# Patient Record
Sex: FEMALE | Race: OTHER | HISPANIC OR LATINO | URBAN - METROPOLITAN AREA
[De-identification: names, ages, dates, MRNs, and addresses within clinical notes are randomized per-mention and may not be internally consistent; named-entity substitution may affect disease eponyms.]

---

## 2017-02-13 ENCOUNTER — OUTPATIENT (OUTPATIENT)
Dept: OUTPATIENT SERVICES | Facility: HOSPITAL | Age: 22
LOS: 1 days | Discharge: HOME | End: 2017-02-13

## 2017-02-27 DIAGNOSIS — F10.21 ALCOHOL DEPENDENCE, IN REMISSION: ICD-10-CM

## 2017-08-17 ENCOUNTER — EMERGENCY (EMERGENCY)
Facility: HOSPITAL | Age: 22
LOS: 1 days | Discharge: HOME | End: 2017-08-17
Attending: EMERGENCY MEDICINE | Admitting: EMERGENCY MEDICINE
Payer: COMMERCIAL

## 2017-08-17 VITALS
TEMPERATURE: 98 F | DIASTOLIC BLOOD PRESSURE: 85 MMHG | RESPIRATION RATE: 18 BRPM | SYSTOLIC BLOOD PRESSURE: 121 MMHG | OXYGEN SATURATION: 98 % | HEART RATE: 82 BPM

## 2017-08-17 DIAGNOSIS — F60.3 BORDERLINE PERSONALITY DISORDER: ICD-10-CM

## 2017-08-17 DIAGNOSIS — F33.9 MAJOR DEPRESSIVE DISORDER, RECURRENT, UNSPECIFIED: ICD-10-CM

## 2017-08-17 DIAGNOSIS — F10.20 ALCOHOL DEPENDENCE, UNCOMPLICATED: ICD-10-CM

## 2017-08-17 PROCEDURE — 99284 EMERGENCY DEPT VISIT MOD MDM: CPT

## 2017-08-17 PROCEDURE — 90792 PSYCH DIAG EVAL W/MED SRVCS: CPT

## 2017-08-17 RX ORDER — ALBUTEROL 90 UG/1
2 AEROSOL, METERED ORAL ONCE
Qty: 0 | Refills: 0 | Status: COMPLETED | OUTPATIENT
Start: 2017-08-17 | End: 2017-08-17

## 2017-08-17 RX ADMIN — ALBUTEROL 2 PUFF(S): 90 AEROSOL, METERED ORAL at 13:36

## 2017-08-17 NOTE — ED BEHAVIORAL HEALTH ASSESSMENT NOTE - NS ED BHA PLAN TR BH CONTACTED FT
call placed to [facility] and Dr. Gonzalez not available today, staff informed of ER visit, treat and release with follow up appointment made.

## 2017-08-17 NOTE — ED BEHAVIORAL HEALTH ASSESSMENT NOTE - FAMILY DETAILS
mother, aunt, grandma, uncle, sister (23) and brother (28) mother, aunt, grandma, uncle, sister (24) and brother (29) mother, aunt, grandma, uncle, twin sister (24) and brother (29)

## 2017-08-17 NOTE — ED BEHAVIORAL HEALTH ASSESSMENT NOTE - SUMMARY
Patient is a 21 year old female, domiciled in [facility] with family, employed as [employer], non-caregiver, with a previous diagnosis of Borderline Personality D/O and MDD, 5 prior hospitalizations, first at [place] in 2013 and most recent at [place] in summer 2015, current outpatient treatment at [facility] with therapist Georgiana Roman and psychiatrist Dr. Sarabia, + hx of self harm behavior cutting, denies prior suicide attempts but admits to one self-aborted gesture, denies manic or psychotic s/s, denies hx of violence or arrests, denies trauma, hx of alcohol abuse with 6 months sober, with no relevant past medical history, brought in by AA sponsor and boyfriend, from home, presenting with depression and self-harm cutting, in the context of guilt over relationship with boyfriend who is a  man.    Patient presents today after an episode of self harm in the context of increased guilt related to her relationship with a  man, two weeks of missed therapy appointments, and one week without her Abilify prescription. Patient denies any suicidal ideations/intent/plan or attempt. Denies any current urges to self harm. Denies any homicidal ideations or preoccupations with violence. Denies s/s of chaitanya or psychosis. Patient carries a diagnosis of Borderline PD and MDD. Her presentation today is most consistent with her axis 2 diagnosis rather than an acute exacerbation of an axis 1 diagnosis. Patient agrees that she is safe to return home at this time and has adequate outpatient resources in place. No acute need for inpatient hospitalization at this time. Friends and patient advised to return to ED or call 911 for any worsening symptoms or safety concerns. Patient is a 22 year old female, domiciled in [facility] with family, unemployed, non-caregiver, with a previous diagnosis of Borderline Personality D/O and MDD, 5 prior hospitalizations, first at [place] in 2013 and most recent at [place] in summer 2015, current outpatient treatment at [facility] with psychiatrist Dr. Gonzalez, + hx of self harm behavior cutting, denies prior suicide attempts but admits to one past overdose, denies manic or psychotic s/s, denies hx of violence or arrests, denies trauma, hx of alcohol dependence with 2 months sober with reported recent Xanax use of 6 pills from sister two days ago but denies daily use, with no relevant past medical history, brought in by staff from [place] internship program on patient request in the context of anxiety symptoms and concerns for risk of relapse on substances given history of Alcohol Dependence.  Patient reports an increase in anxiety symptoms for the "past couple of weeks" in the context of stopping her medications 4 months ago, which were just restarted 10 days ago at her last outpatient psychiatric appointment.  Patient reports she stopped taking her medications at the same time she discontinued treatment with her outpatient therapist but now expresses motivation to reengage in treatment and reports a positive therapeutic relationship with outpatient psychiatrist.  Additionally patient reports attending AA meetings daily and reports she has a strong support system through this, including a close relationship with sponsor who patient reports she feels she can confide in.  Patient with recent stressor of relapse with use of Xanax 2 days ago and on another occasion 1 week prior with patient coming for evaluation out of concern that this may lead to a relapse on alcohol, with no alcohol use reported x 2 months.  Additionally patient reports recent relationship break-up 6 weeks ago but expresses optimism of new relationship over past 2 weeks which patient is optimistic about and future oriented.  Patient endorses guilt feelings related to xanax use but denies acute depressed mood, chronic difficulty concentrating, fatigue, decreased appetite with no recent weight change, low motivation, difficulty falling asleep but no feelings of hopelessness or helplessness, no anhedonia.  Patient has a history of self harm cutting for many years when upset, last done two months ago with noted healed abrasions to upper extremities but denies any acute self injurious thoughts or impulses.  Patient denies any acute or passive suicidal ideation/intent/plan today and reports no suicidal ideation over the past 2 weeks.  No acute HI or violent thoughts, no acute signs or symptoms of dangerousness evident.  Denies any s/s of chaitanya or psychosis, denies auditory/visual hallucinations or delusions. Patient reports generalized anxiety for about 10 years and a history of panic attacks with an overwhelming fear/sense of danger, dizziness and increased sweating but no report of panic attacks within the past month.  Endorses chronic problems related to poor sleep and has taken OTC benadryl without benefit.        Patient presents today after an episode of self harm in the context of increased guilt related to her relationship with a  man, two weeks of missed therapy appointments, and one week without her Abilify prescription. Patient denies any suicidal ideations/intent/plan or attempt. Denies any current urges to self harm. Denies any homicidal ideations or preoccupations with violence. Denies s/s of chaitanya or psychosis. Patient carries a diagnosis of Borderline PD and MDD. Her presentation today is most consistent with her axis 2 diagnosis rather than an acute exacerbation of an axis 1 diagnosis. Patient agrees that she is safe to return home at this time and has adequate outpatient resources in place. No acute need for inpatient hospitalization at this time. Friends and patient advised to return to ED or call 911 for any worsening symptoms or safety concerns. Patient is a 22 year old female, domiciled in [facility] with family, unemployed, non-caregiver, with a previous diagnosis of Borderline Personality D/O and MDD, 5 prior hospitalizations, first at [place] in 2013 and most recent at [place] in summer 2015, current outpatient treatment at [facility] with psychiatrist Dr. Gonzalez, + hx of self harm behavior cutting, denies prior suicide attempts but admits to one past overdose, denies manic or psychotic s/s, denies hx of violence or arrests, denies trauma, hx of alcohol dependence with 2 months sober with reported recent Xanax use of 6 pills from sister two days ago but denies daily use brought in by staff from [place] internship program on patient request in the context of anxiety symptoms and concerns for risk of relapse on substances given history of Alcohol Dependence.  Patient reports an increase in anxiety symptoms in the context of stopping her medications 4 months ago, which were just restarted 10 days ago at her last outpatient psychiatric appointment.  Patient now expresses motivation to reengage in treatment and reports a positive therapeutic relationship with outpatient psychiatrist.  Additionally patient reports attending AA meetings daily and reports she has a strong support system through this.  Patient with recent stressor of relapse with use of Xanax 2 days ago and on another occasion 1 week prior with patient expressing concern that this may lead to a relapse on alcohol.  Patient expresses optimism of new relationship over past 2 weeks and patient is future oriented.  Patient endorses guilt feelings related to xanax use, chronic difficulty concentrating, fatigue, decreased appetite with no recent weight change, low motivation, difficulty falling asleep but denies acute depressed mood with no feelings of hopelessness or helplessness, no anhedonia.  Patient has a history of self harm cutting but denies any acute self injurious thoughts or impulses.  Patient denies any acute or passive suicidal ideation/intent/plan today and reports no suicidal ideation over the past 2 weeks.  No acute HI or violent thoughts, no acute signs or symptoms of dangerousness evident.  Denies any s/s of chaitanya or psychosis, denies auditory/visual hallucinations or delusions. Patient reports generalized anxiety but no evidence of any acute panic attacks.    Patient presents today after a recent episode of xanax use, not-prescribed, in the context of increased guilt related to such and concerns in the context of her alcohol dependence history.  Patient has recently reengaged in outpatient treatment with medications restarted 10 days ago and patient denies any suicidal ideations/intent/plan or attempt. Denies any current urges to self harm. Denies any homicidal ideations or preoccupations with violence. Denies s/s of chaitanya or psychosis. Patient carries a diagnosis of Borderline PD and MDD. Her presentation today is most consistent with concerns related to substance abuse and seeking of reassurance with no signs or symptoms of an acute exacerbation of an axis 1 diagnosis. Patient agrees that she is safe to return home at this time and has adequate outpatient resources in place. No acute need for inpatient hospitalization at this time. Patient is advised to return to ED or call 911 for any worsening symptoms or safety concerns and also provided the [facility] as an additional resource available..

## 2017-08-17 NOTE — ED ADULT NURSE NOTE - OBJECTIVE STATEMENT
pt received in  c/o "suicidal fantasy" with no plans, pt has a hx of SA via OD on pills, at this time pt denies active SI, HI&AH, denies ETOH and substance. calm and cooperative on arrival. awaiting psych eval.

## 2017-08-17 NOTE — ED BEHAVIORAL HEALTH ASSESSMENT NOTE - REFERRAL / APPOINTMENT DETAILS
follow up with outpatient providers - message left at  consultation center with request for expedited appointment follow up with outpatient providers - call placed to [facility] with appointment scheduled for patient with Dr. Gonzalez on Tuesday, August 22 at 7:20pm follow up with outpatient providers - call placed to [facility] with appointment scheduled for patient with Dr. Gonzalez on Tuesday, August 22 at 7:20pm.  Provided information for [facility] also available on a walk-in basis Monday-Friday 9am-7pm.

## 2017-08-17 NOTE — ED PROVIDER NOTE - MEDICAL DECISION MAKING DETAILS
Eric: Concern for anxiety causing thoughts of self-harm and self-medication. Eric: Concern for anxiety causing thoughts of self-harm and self-medication. Cough likely 2/2 smoking and URI. New-onset wheezing; check preg test, CXR; give albuterol. Eric: Concern for anxiety causing thoughts of self-harm and self-medication. Cough likely 2/2 smoking and URI. New-onset wheezing, but is a smoker. Non-focal lung exam. No SOB or hypoxia or tachypnea.

## 2017-08-17 NOTE — ED BEHAVIORAL HEALTH ASSESSMENT NOTE - DETAILS
Paternal uncle- schizophrenia, mother - depression Spoke with AA sponsor and boyfriend who brought pt to ED today Message left for outpatient providers at [facility] hx of self-injurious cutting behavior, hx of past overdose but denies it being of suicidal intent which was reported to have occurred 1.5 years ago. d/w Dc Smart, see SW note Sister - anxiety, Paternal uncle- schizophrenia, mother - depression cough, wheezing, see ED provider note

## 2017-08-17 NOTE — ED PROVIDER NOTE - PLAN OF CARE
Take medications as prescribed. Follow up with your healthcare provider about this issue (these issues): anxiety. Return if symptoms worsen.

## 2017-08-17 NOTE — ED PROVIDER NOTE - OBJECTIVE STATEMENT
Eric: 22 F, depression and anxiety, bipolar, borderline personality, sees Dr. Lema at [facility], brought by staff at [facility] [employer] [facility] because she's had lots of anxiety recently. Has taken sister's Xanax. Fleeting suicidal "fantasies" without true intent to hurt self. No HI.

## 2017-08-17 NOTE — ED BEHAVIORAL HEALTH ASSESSMENT NOTE - SAFETY PLAN DETAILS
Friends and patient advised to return to ED or call 911 for any worsening symptoms or safety concerns. Patient advised to return to ED or call 911 for any worsening symptoms or safety concerns.

## 2017-08-17 NOTE — ED BEHAVIORAL HEALTH ASSESSMENT NOTE - DIFFERENTIAL
None - MDD and Borderline PD r/o anxiety disorder nos, r/o generalized anxiety disorder, r/o benzodiazepine abuse

## 2017-08-17 NOTE — ED BEHAVIORAL HEALTH ASSESSMENT NOTE - DESCRIPTION
Patient was calm and cooperative in the ED and did not exhibit any aggression. Pt did not require any prn medications or any physical restraints. no PMH, NKA see HPI no PMH Patient was calm, pleasant and cooperative in the ED and did not exhibit any aggression. Pt did not require any prn medications or any physical restraints. Born in [place], raised in [place].  High school graduate.

## 2017-08-17 NOTE — ED BEHAVIORAL HEALTH ASSESSMENT NOTE - PAST PSYCHOTROPIC MEDICATION
Neurontin, Zoloft, Lamictal, Depakote, Prozac, Trazodone Neurontin, Zoloft, Lamictal, Depakote, Prozac, Trazodone, Wellbutrin Neurontin, Zoloft, Lamictal, Depakote, Prozac, Trazodone, Wellbutrin, Seroquel, Lithium, Klonopin

## 2017-08-17 NOTE — ED ADULT NURSE REASSESSMENT NOTE - NS ED NURSE REASSESS COMMENT FT1
pt remains calm and cooperative. medically cleared for DC. as per MD Reyes, pt's x-ray order to be discontinued. pt left BH at 1432 hours. pt remains calm and cooperative. medically cleared for DC. pt unable to give urine sample for urine pregnancy testing. as per MD Reyes, pt's x-ray order to be discontinued. pt left BH at 1432 hours.

## 2017-08-17 NOTE — ED PROVIDER NOTE - CARE PLAN
Principal Discharge DX:	Anxiety Principal Discharge DX:	Anxiety  Instructions for follow-up, activity and diet:	Take medications as prescribed. Follow up with your healthcare provider about this issue (these issues): anxiety. Return if symptoms worsen.

## 2017-08-17 NOTE — ED BEHAVIORAL HEALTH NOTE - BEHAVIORAL HEALTH NOTE
Writer received a call from Dc Smart  who states he is with the [employer] program in [facility] [employer].  Patient resides with her parents.  He states patient attends AA groups at the [employer] and found out about their internship program to which she applied.  He states patient came in today at 9:45 and told him she wasn't feeling well. Patient told him she feared relapsing on Alcohol because she had taken 6 Xanax two days ago.  He states she did not report being suicidal, but he didn't ask her if she was suicidal.  He suggested patient seek professional help and speak to someone to which she agreed.  He states patient mentioned to him being at [facility], [facility] and [facility] in the past.  Patient chose to go to [facility] to speak to someone.    Writer called pt's mother Karolyn Sharpe  and left a voicemail requesting a callback to social work [facility].

## 2017-08-17 NOTE — ED BEHAVIORAL HEALTH ASSESSMENT NOTE - OTHER PAST PSYCHIATRIC HISTORY (INCLUDE DETAILS REGARDING ONSET, COURSE OF ILLNESS, INPATIENT/OUTPATIENT TREATMENT)
Multiple hospitalizations, last in summer 2015. DX of Borderline PD and MDD  Outpatient follow up therapist and psychiatrist at [facility] Multiple hospitalizations, last in summer 2015. DX of Borderline PD and MDD  Outpatient follow up with Dr. Gonzalez, psychiatrist at [facility] Multiple hospitalizations, 5 reported, last in summer 2015 at [facility] with a history of Borderline PD and MDD.  Previous outpatient [facility] from 2/13/17-4/5/17.  Currently with outpatient follow up with Dr. Gonzalez, psychiatrist at [facility].  Was previously seeing a therapist but not over past 4 months.  History of attention seeking behavior with past overdose reported to have been attention seeking in etiology, history of self injurious behavior but not recently. Multiple hospitalizations, 5 reported, last in summer 2015 at [facility] with a history of Borderline PD and MDD.  Previous outpatient treatment at [facility] program from 2/13/17-4/5/17.  Currently with outpatient follow up with Dr. Gonzalez, psychiatrist at [facility], was previously in treatment at [facility] in 2016..  Was previously seeing a therapist but not over past 4 months.  History of attention seeking behavior with past overdose reported to have been attention seeking in etiology, history of self injurious behavior but not recently.

## 2017-08-17 NOTE — ED BEHAVIORAL HEALTH ASSESSMENT NOTE - CASE SUMMARY
Patient is a 20 yo female with alcohol abuse, currently in full sustained remission (nonuse > 6 months), attending AA, with a previous diagnosis of Borderline Personality with hx of self-injurious, non-serious cutting behaviors, and Major Depression; 5 prior hospitalizations; currently attending outpatient treatment at [facility] (both therapy and med management), + hx of self harm behavior cutting, denies prior suicide attempts but admits to one remote self-aborted gesture, presenting with depressed mood, feeling guilty with recent small, non-serious, superficial self-injurious behavior, secondary to feeling guilty over having affair with a  man and 1 week of having run out of Abilify and she cannot get a new RX due to insurance already paying for this month's supply. And paying out of pocket would be very expensive. Patient can follow up with her outpatient psychiatrist at this time as get a new RX for Abilify as scheduled. As Patient does not have a primary psychotic disorder, it is much less worrisome for her not to take the Abilify. Discharge home.

## 2017-08-17 NOTE — ED BEHAVIORAL HEALTH ASSESSMENT NOTE - PRIMARY DX
Major depressive disorder Personality disorder Recurrent major depressive disorder, remission status unspecified Borderline personality disorder

## 2017-08-17 NOTE — ED ADULT NURSE NOTE - CHIEF COMPLAINT QUOTE
P/t with hx depression c/o of increased depression and anxiety with SI no plan p/t appears calm and cooperative

## 2017-08-17 NOTE — ED BEHAVIORAL HEALTH ASSESSMENT NOTE - DESCRIPTION (FIRST USE, LAST USE, QUANTITY, FREQUENCY, DURATION)
1 PPD Hx of alcohol abuse - see HPI for details, sober for about 6 months and attends AA Hx of occasional use - denies any use in past 6 months Utox + for barbituates which is explained by pt admission that she has taken some of her mother's migraine medication (unknown name) recent xanax use from sister on 2 discrete occasions over the past 2 weeks. Hx of alcohol abuse - see HPI for details, sober for about 2 months and attends AA

## 2017-08-17 NOTE — ED BEHAVIORAL HEALTH ASSESSMENT NOTE - OTHER
AA Sponsor CVM Provided support, reassurance and psychoeducation.  Advised abstinence from alcohol, benzodiazepines and all illicit substances, encouraged to continue to attend AA/12 step groups daily and continue engagement with sponsor. Dc Smart - from internship program

## 2017-08-17 NOTE — ED BEHAVIORAL HEALTH ASSESSMENT NOTE - HPI (INCLUDE ILLNESS QUALITY, SEVERITY, DURATION, TIMING, CONTEXT, MODIFYING FACTORS, ASSOCIATED SIGNS AND SYMPTOMS)
Patient is a 21 year old female, domiciled in [facility] with family, employed as HHA, non-caregiver, with a previous diagnosis of Borderline Personality D/O and MDD, 5 prior hospitalizations, first at [facility] in 2013 and most recent at [place] in summer 2015, current outpatient treatment at [facility] with therapist Georgiana Roman and psychiatrist Dr. Sarabia, + hx of self harm behavior cutting, denies prior suicide attempts but admits to one self-aborted gesture, denies manic or psychotic s/s, denies hx of violence or arrests, denies trauma, hx of alcohol abuse with 6 months sober, with no relevant past medical history, brought in by AA sponsor and boyfriend, from home, presenting with depression and self-harm cutting, in the context of guilt over relationship with boyfriend who is a  man.    Patient has a history of self harm cutting for many years, last done two months ago. She has hx of cutting both arms and legs when upset. Today she reports she cut her left forearm with the top of a can because she was experiencing guilt over her relationship with her boyfriend who is . She reports that they have been dating since February and she immediately started feeling guilty but did not think it was serious. She stated "I thought it would just be a fling but I'm falling in love with him now". She states that her boyfriend was honest from the beginning that he would not leave his wife but recently she has been "trying to talk to him and expecting something different even though I know better". She denies any acute trigger today but reports that the negative feelings have been building for some time now. Patient denies any suicidal ideations/intent/plan or attempt today. States she cut herself because "I had pain in my heart". Pt's ineffective coping was compounded by the fact that she lost her bottle of Abilify and therefore has not had any for about 1 week and will not be able to fill a new prescription for about two weeks due to insurance issues. She notified her psychiatrist of this and he told her she would have to wait until next month before she could get any Abilify and in the mean time should just continue her other medications. In addition, pt reports her outpatient therapist has cancelled the past two appointments so she has not been able to process her feelings in therapy. Patient's AA sponsor is normally a large source of support for her but pt knows that she does not approve of patient's relationship and therefore pt feels she cannot talk to her sponsor about it.    Patient has a history of a self-aborted suicidal gesture ("I was close to taking pills but I stopped myself") but denies any ideations or intent recently. Patient reports depression symptoms including "on and off" sad mood, chronic worthlessness, guilt feelings, chronic difficulty concentrating, fatigue, decreased appetite, low motivation in the AM and difficulty falling asleep. Denies any s/s of chaitanya or psychosis, denies auditory/visual hallucinations or delusions. Patient reports generalized anxiety for about 10 years with feelings of nervousness a "few times per week". Reports hx of panic attacks with an overwhelming fear/sense of danger, dizziness and increased sweating. Last panic attack was 3 months ago. Endorses chronically poor sleep, which she was taking Trazodone for but has recently d/c'd due to "feeling like a zombie". Endorses adequate appetite with no recent change in weight. Patient started drinking around age 19 and was abusing alcohol for about 1.5 years, drinking Vodka and Malt liquor to excess daily, being drunk by noon most days. Her sober date is 10/29/15 and she attends AA meetings 6 times per week. Aside from pt's anxiety/guilt over her relationship, she reports that "things have been good for the last few months" she is optimistic about her sobriety and has enrolled in CNA school. Patient is compliant with her medications (aside from the Abilify which she lost) and has outpatient follow up with [facility].    COLLATERAL obtained from pt's AA sponsor Kera and her boyfriend Vipul who both brought pt to ER today. They confirm above report of events and state they agree pt's self harm cutting today was not suicidal in nature. Deny any recent suicidal statements or gestures. Kera reports some s/s of depression with increased sleep, fatigue and some episodes of crying. She reports pt has problems at home with her mother but no physical altercations have taken place. No acute trigger for today's events. Both agree that pt's actions today were attention seeking in nature and did not represent an acute danger to self. Patient is a 22 year old female, domiciled in [facility] with family, previously employed as HHA but now unemployed, non-caregiver, with a previous diagnosis of Borderline Personality D/O and MDD, 5 prior hospitalizations, first at [facility] in 2013 and most recent at [place] in summer 2015, current outpatient treatment at [facility] [place] with psychiatrist Dr. Gonzalez, + hx of self harm behavior of cutting in the past, denies prior suicide attempts but admits to one past history of overdose a year and a half ago which patient denies being a suicide attempt, denies manic or psychotic s/s, denies hx of violence or arrests, denies trauma, hx of alcohol dependence with 2 months sober, with no relevant past medical history, brought in by AA sponsor and boyfriend, from home, presenting with depression and self-harm cutting, in the context of guilt over relationship with boyfriend who is a  man.    Patient has a history of self harm cutting for many years, last done two months ago. She has hx of cutting both arms and legs when upset. Today she reports she cut her left forearm with the top of a can because she was experiencing guilt over her relationship with her boyfriend who is . She reports that they have been dating since February and she immediately started feeling guilty but did not think it was serious. She stated "I thought it would just be a fling but I'm falling in love with him now". She states that her boyfriend was honest from the beginning that he would not leave his wife but recently she has been "trying to talk to him and expecting something different even though I know better". She denies any acute trigger today but reports that the negative feelings have been building for some time now. Patient denies any suicidal ideations/intent/plan or attempt today. States she cut herself because "I had pain in my heart". Pt's ineffective coping was compounded by the fact that she lost her bottle of Abilify and therefore has not had any for about 1 week and will not be able to fill a new prescription for about two weeks due to insurance issues. She notified her psychiatrist of this and he told her she would have to wait until next month before she could get any Abilify and in the mean time should just continue her other medications. In addition, pt reports her outpatient therapist has cancelled the past two appointments so she has not been able to process her feelings in therapy. Patient's AA sponsor is normally a large source of support for her but pt knows that she does not approve of patient's relationship and therefore pt feels she cannot talk to her sponsor about it.    Patient has a history of a self-aborted suicidal gesture ("I was close to taking pills but I stopped myself") but denies any ideations or intent recently. Patient reports depression symptoms including "on and off" sad mood, chronic worthlessness, guilt feelings, chronic difficulty concentrating, fatigue, decreased appetite, low motivation in the AM and difficulty falling asleep. Denies any s/s of chaitanya or psychosis, denies auditory/visual hallucinations or delusions. Patient reports generalized anxiety for about 10 years with feelings of nervousness a "few times per week". Reports hx of panic attacks with an overwhelming fear/sense of danger, dizziness and increased sweating. Last panic attack was 3 months ago. Endorses chronically poor sleep, which she was taking Trazodone for but has recently d/c'd due to "feeling like a zombie". Endorses adequate appetite with no recent change in weight. Patient started drinking around age 19 and was abusing alcohol for about 1.5 years, drinking Vodka and Malt liquor to excess daily, being drunk by noon most days. Her sober date is 10/29/15 and she attends AA meetings 6 times per week. Aside from pt's anxiety/guilt over her relationship, she reports that "things have been good for the last few months" she is optimistic about her sobriety and has enrolled in [employer] school. Patient is compliant with her medications (aside from the Abilify which she lost) and has outpatient follow up with [facility].    COLLATERAL obtained from pt's AA sponsor Kera and her boyfriend Vipul who both brought pt to ER today. They confirm above report of events and state they agree pt's self harm cutting today was not suicidal in nature. Deny any recent suicidal statements or gestures. Kera reports some s/s of depression with increased sleep, fatigue and some episodes of crying. She reports pt has problems at home with her mother but no physical altercations have taken place. No acute trigger for today's events. Both agree that pt's actions today were attention seeking in nature and did not represent an acute danger to self. Patient is a 22 year old female, domiciled in [facility] with family, previously employed as HHA but now unemployed, non-caregiver, with a previous diagnosis of Borderline Personality D/O and MDD, 5 prior hospitalizations, first at [facility] in 2013 and most recent at [place] in summer 2015, current outpatient treatment at [facility] [place] with psychiatrist Dr. Gonzalez, + hx of self harm behavior of cutting in the past, denies prior suicide attempts but admits to one past history of overdose a year and a half ago which patient denies being a suicide attempt, denies manic or psychotic s/s, denies hx of violence or arrests, denies trauma, hx of alcohol dependence with 2 months sober with reported recent Xanax use of 6 pills from sister two days ago but denies daily use, with no relevant past medical history, brought in by staff from [place] internship program on patient request in the context of anxiety symptoms and concerns for risk of relapse on substances given history of Alcohol Dependence.    Patient has a history of self harm cutting for many years when upset, last done two months ago with noted healed abrasions to upper extremities but denies any acute self injurious thoughts or impulses.       She has hx of cutting both arms and legs when upset. Today she reports she cut her left forearm with the top of a can because she was experiencing guilt over her relationship with her boyfriend who is . She reports that they have been dating since February and she immediately started feeling guilty but did not think it was serious. She stated "I thought it would just be a fling but I'm falling in love with him now". She states that her boyfriend was honest from the beginning that he would not leave his wife but recently she has been "trying to talk to him and expecting something different even though I know better". She denies any acute trigger today but reports that the negative feelings have been building for some time now. Patient denies any suicidal ideations/intent/plan or attempt today. States she cut herself because "I had pain in my heart". Pt's ineffective coping was compounded by the fact that she lost her bottle of Abilify and therefore has not had any for about 1 week and will not be able to fill a new prescription for about two weeks due to insurance issues. She notified her psychiatrist of this and he told her she would have to wait until next month before she could get any Abilify and in the mean time should just continue her other medications. In addition, pt reports her outpatient therapist has cancelled the past two appointments so she has not been able to process her feelings in therapy. Patient's AA sponsor is normally a large source of support for her but pt knows that she does not approve of patient's relationship and therefore pt feels she cannot talk to her sponsor about it.    Patient has a history of a self-aborted suicidal gesture ("I was close to taking pills but I stopped myself") but denies any ideations or intent recently. Patient reports depression symptoms including "on and off" sad mood, chronic worthlessness, guilt feelings, chronic difficulty concentrating, fatigue, decreased appetite, low motivation in the AM and difficulty falling asleep. Denies any s/s of chaitanya or psychosis, denies auditory/visual hallucinations or delusions. Patient reports generalized anxiety for about 10 years with feelings of nervousness a "few times per week". Reports hx of panic attacks with an overwhelming fear/sense of danger, dizziness and increased sweating. Last panic attack was 3 months ago. Endorses chronically poor sleep, which she was taking Trazodone for but has recently d/c'd due to "feeling like a zombie". Endorses adequate appetite with no recent change in weight. Patient started drinking around age 19 and was abusing alcohol for about 1.5 years, drinking Vodka and Malt liquor to excess daily, being drunk by noon most days. Her sober date is 10/29/15 and she attends AA meetings 6 times per week. Aside from pt's anxiety/guilt over her relationship, she reports that "things have been good for the last few months" she is optimistic about her sobriety and has enrolled in [employer] school. Patient is compliant with her medications (aside from the Abilify which she lost) and has outpatient follow up with [facility].    COLLATERAL obtained from pt's AA sponsor Kera and her boyfriend Vipul who both brought pt to ER today. They confirm above report of events and state they agree pt's self harm cutting today was not suicidal in nature. Deny any recent suicidal statements or gestures. Kera reports some s/s of depression with increased sleep, fatigue and some episodes of crying. She reports pt has problems at home with her mother but no physical altercations have taken place. No acute trigger for today's events. Both agree that pt's actions today were attention seeking in nature and did not represent an acute danger to self. Patient is a 22 year old female, domiciled in [facility] with family, previously employed as HHA but now unemployed, non-caregiver, with a previous diagnosis of Borderline Personality D/O and MDD, 5 prior hospitalizations, first at [place] in 2013 and most recent at [place] in summer 2015, current outpatient treatment at [facility] [place] with psychiatrist Dr. Gonzalez, + hx of self harm behavior of cutting in the past, denies prior suicide attempts but admits to one past history of overdose a year and a half ago which patient denies being a suicide attempt, denies manic or psychotic s/s, denies hx of violence or arrests, denies trauma, hx of alcohol dependence with 2 months sober with reported recent Xanax use of 6 pills from sister two days ago but denies daily use, with no relevant past medical history, brought in by staff from [place] internship program on patient request in the context of anxiety symptoms and concerns for risk of relapse on substances given history of Alcohol Dependence.    Patient has a history of self harm cutting for many years when upset, last done two months ago with noted healed abrasions to upper extremities but denies any acute self injurious thoughts or impulses.       Patient denies any suicidal ideations/intent/plan or attempt today. Patient's AA sponsor is normally a large source of support for her but pt knows that she does not approve of patient's relationship and therefore pt feels she cannot talk to her sponsor about it.    Patient has a history of a self-aborted suicidal gesture ("I was close to taking pills but I stopped myself") but denies any ideations or intent recently. Patient reports depression symptoms including "on and off" sad mood, chronic worthlessness, guilt feelings, chronic difficulty concentrating, fatigue, decreased appetite, low motivation in the AM and difficulty falling asleep. Denies any s/s of chaitanya or psychosis, denies auditory/visual hallucinations or delusions. Patient reports generalized anxiety for about 10 years with feelings of nervousness a "few times per week". Reports hx of panic attacks with an overwhelming fear/sense of danger, dizziness and increased sweating. Last panic attack was 3 months ago. Endorses chronically poor sleep, which she was taking Trazodone for but has recently d/c'd due to "feeling like a zombie". Endorses adequate appetite with no recent change in weight. Patient started drinking around age 19 and was abusing alcohol for about 1.5 years, drinking Vodka and Malt liquor to excess daily, being drunk by noon most days. Her sober date is 10/29/15 and she attends AA meetings 6 times per week. Aside from pt's anxiety/guilt over her relationship, she reports that "things have been good for the last few months" she is optimistic about her sobriety and has enrolled in [employer]. Patient is compliant with her medications (aside from the Abilify which she lost) and has outpatient follow up with [facility].    COLLATERAL obtained from pt's AA sponsor Kera and her boyfriend Vipul who both brought pt to ER today. They confirm above report of events and state they agree pt's self harm cutting today was not suicidal in nature. Deny any recent suicidal statements or gestures. Kera reports some s/s of depression with increased sleep, fatigue and some episodes of crying. She reports pt has problems at home with her mother but no physical altercations have taken place. No acute trigger for today's events. Both agree that pt's actions today were attention seeking in nature and did not represent an acute danger to self. Patient is a 22 year old female, domiciled in [facility] with family, previously employed as HHA but now unemployed, non-caregiver, with a previous diagnosis of Borderline Personality D/O and MDD, 5 prior hospitalizations, first at [place] in 2013 and most recent at [place] in summer 2015, current outpatient treatment at [facility] [place] with psychiatrist Dr. Gonzalez, + hx of self harm behavior of cutting in the past, denies prior suicide attempts but admits to one past history of overdose a year and a half ago which patient denies being a suicide attempt, denies manic or psychotic s/s, denies hx of violence or arrests, denies trauma, hx of alcohol dependence with 2 months sober with reported recent Xanax use of 6 pills from sister two days ago but denies daily use, with no relevant past medical history, brought in by staff from [place] internship program on patient request in the context of anxiety symptoms and concerns for risk of relapse on substances given history of Alcohol Dependence.  Patient reports an increase in anxiety symptoms for the "past couple of weeks" in the context of stopping her medications 4 months ago, which were just restarted 10 days ago at her last outpatient psychiatric appointment.  Patient reports she stopped taking her medications at the same time she discontinued treatment with her outpatient therapist but now expresses motivation to reengage in treatment and reports a positive therapeutic relationship with outpatient psychiatrist.  Additionally patient reports attending AA meetings daily and reports she has a strong support system through this, including a close relationship with sponsor who patient reports she feels she can confide in.  Patient with recent stressor of relapse with use of Xanax 2 days ago and on another occasion 1 week prior with patient coming for evaluation out of concern that this may lead to a relapse on alcohol, with no alcohol use reported x 2 months.  Additionally patient reports recent relationship break-up 6 weeks ago but expresses optimism of new relationship over past 2 weeks which patient is optimistic about and future oriented.      Patient has a history of self harm cutting for many years when upset, last done two months ago with noted healed abrasions to upper extremities but denies any acute self injurious thoughts or impulses.       Patient denies any suicidal ideations/intent/plan or attempt today. Patient's AA sponsor is normally a large source of support for her but pt knows that she does not approve of patient's relationship and therefore pt feels she cannot talk to her sponsor about it.    Patient has a history of a self-aborted suicidal gesture ("I was close to taking pills but I stopped myself") but denies any ideations or intent recently. Patient reports depression symptoms including "on and off" sad mood, chronic worthlessness, guilt feelings, chronic difficulty concentrating, fatigue, decreased appetite, low motivation in the AM and difficulty falling asleep. Denies any s/s of chaitanya or psychosis, denies auditory/visual hallucinations or delusions. Patient reports generalized anxiety for about 10 years with feelings of nervousness a "few times per week". Reports hx of panic attacks with an overwhelming fear/sense of danger, dizziness and increased sweating. Last panic attack was 3 months ago. Endorses chronically poor sleep, which she was taking Trazodone for but has recently d/c'd due to "feeling like a zombie". Endorses adequate appetite with no recent change in weight. Patient started drinking around age 19 and was abusing alcohol for about 1.5 years, drinking Vodka and Malt liquor to excess daily, being drunk by noon most days. Her sober date is 10/29/15 and she attends AA meetings 6 times per week. Aside from pt's anxiety/guilt over her relationship, she reports that "things have been good for the last few months" she is optimistic about her sobriety and has enrolled in CNA school. Patient is compliant with her medications (aside from the Abilify which she lost) and has outpatient follow up with [facility].    COLLATERAL obtained from pt's AA sponsor Kera and her boyfriend Vipul who both brought pt to ER today. They confirm above report of events and state they agree pt's self harm cutting today was not suicidal in nature. Deny any recent suicidal statements or gestures. Kera reports some s/s of depression with increased sleep, fatigue and some episodes of crying. She reports pt has problems at home with her mother but no physical altercations have taken place. No acute trigger for today's events. Both agree that pt's actions today were attention seeking in nature and did not represent an acute danger to self. Patient is a 22 year old female, domiciled in [facility] with family, previously employed as HHA but now unemployed, non-caregiver, with a previous diagnosis of Borderline Personality D/O and MDD, 5 prior hospitalizations, first at [place] in 2013 and most recent at [place] in summer 2015, current outpatient treatment at [facility] [place] with psychiatrist Dr. Gonzalez, + hx of self harm behavior of cutting in the past, denies prior suicide attempts but admits to one past history of overdose a year and a half ago which patient denies being a suicide attempt, denies manic or psychotic s/s, denies hx of violence or arrests, denies trauma, hx of alcohol dependence with 2 months sober with reported recent Xanax use of 6 pills from sister two days ago but denies daily use, with no relevant past medical history, brought in by staff from [place] internship program on patient request in the context of anxiety symptoms and concerns for risk of relapse on substances given history of Alcohol Dependence.  Patient reports an increase in anxiety symptoms for the "past couple of weeks" in the context of stopping her medications 4 months ago, which were just restarted 10 days ago at her last outpatient psychiatric appointment.  Patient reports she stopped taking her medications at the same time she discontinued treatment with her outpatient therapist but now expresses motivation to reengage in treatment and reports a positive therapeutic relationship with outpatient psychiatrist.  Additionally patient reports attending AA meetings daily and reports she has a strong support system through this, including a close relationship with sponsor who patient reports she feels she can confide in.  Patient with recent stressor of relapse with use of Xanax 2 days ago and on another occasion 1 week prior with patient coming for evaluation out of concern that this may lead to a relapse on alcohol, with no alcohol use reported x 2 months.  Additionally patient reports recent relationship break-up 6 weeks ago but expresses optimism of new relationship over past 2 weeks which patient is optimistic about and future oriented.  Patient endorses guilt feelings related to xanax use but denies acute depressed mood, chronic difficulty concentrating, fatigue, decreased appetite, low motivation, difficulty falling asleep but no feelings of hopelessness or helplessness, no anhedonia.  Patient has a history of self harm cutting for many years when upset, last done two months ago with noted healed abrasions to upper extremities but denies any acute self injurious thoughts or impulses.  Patient denies any acute or passive suicidal ideation/intent/plan today and reports no suicidal ideation over the past 2 weeks.     Denies any s/s of chaitanya or psychosis, denies auditory/visual hallucinations or delusions. Patient reports generalized anxiety for about 10 years and a history of panic attacks with an overwhelming fear/sense of danger, dizziness and increased sweating but no report of panic attacks within the past month.  Endorses chronic problems related to poor sleep and has taken OTC benadryl without benefit.      , which she was taking  Trazodone for but has recently d/c'd due to "feeling like a zombie". Endorses adequate appetite with no recent change in weight. Patient started drinking around age 19 and was abusing alcohol for about 1.5 years, drinking Vodka and Malt liquor to excess daily, being drunk by noon most days. Her sober date is 10/29/15 and she attends AA meetings 6 times per week. Aside from pt's anxiety/guilt over her relationship, she reports that "things have been good for the last few months" she is optimistic about her sobriety and has enrolled in [employer]. Patient is compliant with her medications (aside from the Abilify which she lost) and has outpatient follow up with  [facility].    COLLATERAL obtained from pt's AA sponsor Kera and her boyfriend Vipul who both brought pt to ER today. They confirm above report of events and state they agree pt's self harm cutting today was not suicidal in nature. Deny any recent suicidal statements or gestures. Kera reports some s/s of depression with increased sleep, fatigue and some episodes of crying. She reports pt has problems at home with her mother but no physical altercations have taken place. No acute trigger for today's events. Both agree that pt's actions today were attention seeking in nature and did not represent an acute danger to self. Patient is a 22 year old female, domiciled in [facility] [facility] with family, previously employed as HHA but now unemployed, non-caregiver, with a previous diagnosis of Borderline Personality D/O and MDD, 5 prior hospitalizations, first at [facility] in 2013 and most recent at [facility] in summer 2015, current outpatient treatment at [facility] [place] with psychiatrist Dr. Gonzalez, + hx of self harm behavior of cutting in the past, denies prior suicide attempts but admits to one past history of overdose a year and a half ago which patient denies being a suicide attempt, denies manic or psychotic s/s, denies hx of violence or arrests, denies trauma, hx of alcohol dependence with 2 months sober with reported recent Xanax use of 6 pills from sister two days ago but denies daily use, with no relevant past medical history, brought in by staff from [place] internship program on patient request in the context of anxiety symptoms and concerns for risk of relapse on substances given history of Alcohol Dependence.  Patient reports an increase in anxiety symptoms for the "past couple of weeks" in the context of stopping her medications 4 months ago, which were just restarted 10 days ago at her last outpatient psychiatric appointment.  Patient reports she stopped taking her medications at the same time she discontinued treatment with her outpatient therapist but now expresses motivation to reengage in treatment and reports a positive therapeutic relationship with outpatient psychiatrist.  Additionally patient reports attending AA meetings daily and reports she has a strong support system through this, including a close relationship with sponsor who patient reports she feels she can confide in.  Patient with recent stressor of relapse with use of Xanax 2 days ago and on another occasion 1 week prior with patient coming for evaluation out of concern that this may lead to a relapse on alcohol, with no alcohol use reported x 2 months.  Additionally patient reports recent relationship break-up 6 weeks ago but expresses optimism of new relationship over past 2 weeks which patient is optimistic about and future oriented.  Patient endorses guilt feelings related to xanax use but denies acute depressed mood, chronic difficulty concentrating, fatigue, decreased appetite with no recent weight change, low motivation, difficulty falling asleep but no feelings of hopelessness or helplessness, no anhedonia.  Patient has a history of self harm cutting for many years when upset, last done two months ago with noted healed abrasions to upper extremities but denies any acute self injurious thoughts or impulses.  Patient denies any acute or passive suicidal ideation/intent/plan today and reports no suicidal ideation over the past 2 weeks.  No acute HI or violent thoughts, no acute signs or symptoms of dangerousness evident.  Denies any s/s of chaitanya or psychosis, denies auditory/visual hallucinations or delusions. Patient reports generalized anxiety for about 10 years and a history of panic attacks with an overwhelming fear/sense of danger, dizziness and increased sweating but no report of panic attacks within the past month.  Endorses chronic problems related to poor sleep and has taken OTC benadryl without benefit.      Patient started drinking around age 19 and was abusing alcohol for about 1.5 years, drinking to excess daily, being drunk by noon most days.  Her sober date was initially in  10/2015, relapsed 2 months ago but sober from alcohol since and attends AA meetings 6-7 times per week. Aside from pt's anxiety/guilt over her recent use of Xanax, she is optimistic about her sobriety and has engaged in the [place] internship program and is fully engaged in AA/12 step groups. Patient is compliant with her medications since restarted 10 days ago and has outpatient follow up at [facility] and is now willing to reengage in outpatient psychotherapy which patient states she plans to discuss with her psychiatrist at next appointment.  Patient does not present as an acute risk to self or others at this time.    See  note in chart for additional collateral obtained.

## 2017-08-17 NOTE — ED BEHAVIORAL HEALTH ASSESSMENT NOTE - SUICIDE PROTECTIVE FACTORS
Positive therapeutic relationships/Identifies reasons for living/Responsibility to family and others/Future oriented/Engaged in work or school/Supportive social network or family

## 2017-08-17 NOTE — ED BEHAVIORAL HEALTH ASSESSMENT NOTE - RISK ASSESSMENT
Patient is at chronic elevated risk for self-harm given her long psychiatric history; hx of a prior suicide attempts/gestures; Borderline Personality Disorder with impulsivity, poor frustration tolerance and excess emotional reactivity. Protective factors: young age; no significant medical comorbidity; no hx of aggression towards others; no hx of legal issues. Medication and treatment compliant; motivated for treatment. Patient has been calm, cooperative, polite and pleasant in the ED and provided appropriate, reliable collateral. Patient has been compliant with medications and treatment, is abstinent from substances, has no access to firearms, and displays help-seeking behavior. While Patient is at chronically elevated risk for harm to self given her past history, she has been treatment compliant with no acute worsening of her psychiatric symptoms, appears to be baseline and thereby would not benefit from inpatient hospitalization at this time. Patient is at chronic elevated risk for self-harm given her long psychiatric history; hx of a prior suicide gesture; hx of alcohol dependence; Borderline Personality Disorder with impulsivity, poor frustration tolerance and excess emotional reactivity. Protective factors: young age; no significant medical comorbidity; no hx of aggression towards others; no hx of legal issues. Medication and treatment compliant; motivated for treatment. Patient has been calm, cooperative, polite and pleasant in the ED and provided appropriate, reliable collateral. Patient has been compliant with medications and treatment with recent reengagement, has no access to firearms, and displays help-seeking behavior. While patient is at chronically elevated risk for harm to self given her past history, there is no acute worsening of her psychiatric symptoms, appears to be baseline and thereby would not benefit from inpatient hospitalization at this time and is appropriate for outpatient psychiatric treatment.

## 2017-08-17 NOTE — ED BEHAVIORAL HEALTH NOTE - BEHAVIORAL HEALTH NOTE
Writer called [employer]  to obtain collateral.  Writer left a voicemail requesting callback to social work [facility].

## 2018-11-28 NOTE — ED ADULT TRIAGE NOTE - CHIEF COMPLAINT QUOTE
"` `           [facility] SURGICAL: [phone]                                              INTERAGENCY TRANSFER FORM - NURSING   2018                    Hospital Admission Date: 2018  YARA ARMAS   : 1950  Sex: Female        Attending Provider: Chapo Bocanegra MD     Allergies:  Doxycycline, Seasonal Allergies    Infection:  None   Service:  SURGERY    Ht:  1.626 m (5' 4\")   Wt:  83.9 kg (185 lb)   Admission Wt:  83.9 kg (185 lb)    BMI:  31.76 kg/m 2   BSA:  1.95 m 2            Patient PCP Information     Provider PCP Type    Maria Luz Kenny MD General      Current Code Status     Date Active Code Status Order ID Comments User Context       Prior      Code Status History     Date Active Date Inactive Code Status Order ID Comments User Context    2018 10:18 AM  Full Code [id number]  Chapo Bocanegra MD Outpatient    2018  7:27 PM 2018 10:18 AM Full Code [id number]  Chapo Bocanegra MD Inpatient    2013  5:27 PM 2018  7:27 PM Full Code [id number]  Lily Lozano MD Outpatient    2013  6:15 PM 2013  5:27 PM Full Code [id number]  [facility]      Advance Directives        Scanned docmt in ACP Activity?           No scanned doc        Hospital Problems as of 2018              Priority Class Noted POA    GERD (gastroesophageal reflux disease) Medium  2013 Yes    Chiari I malformation (H) Medium  2013 Yes    * (Principal)S/P lumbar fusion Medium  2018 Yes      Non-Hospital Problems as of 2018              Priority Class Noted    Rectocele Medium  10/12/2009    Cystocele Medium  10/12/2009    Osteoporosis Medium  10/12/2009    CARDIOVASCULAR SCREENING; LDL GOAL LESS THAN 130 Medium  10/31/2010    Chiari malformation type I (H) Medium  2013    Diverticulosis of colon Medium  2013    Allergic rhinitis Medium  2013    Adjustment disorder with anxiety and depression Medium  " 6/18/2013    Pituitary microadenoma (H) Medium  6/18/2013    Tobacco use disorder Medium  6/18/2013    Health Care Home Low  6/18/2013    Colon polyps Medium  1/15/2016    Lumbar radiculopathy Medium  3/17/2016    RBBB Medium  11/16/2018      Immunizations     Name Date      Influenza Vaccine IM 3yrs+ 4 Valent IIV4 09/11/17     Pneumo Conj 13-V (2010&after) 12/18/15     Pneumococcal 23 valent 05/05/17     Pneumococcal 23 valent 09/09/11     TD (ADULT, 7+) 04/23/07     TDAP Vaccine (Adacel) 05/26/17     Tdap (Adacel,Boostrix) 04/23/07          END      ASSESSMENT     Discharge Profile Flowsheet     EXPECTED DISCHARGE     PAS Number  [id number] 11/30/18 1620    Expected Discharge Date  12/01/18 11/30/18 1504   SKIN      DISCHARGE NEEDS ASSESSMENT     Inspection of bony prominences  Full 12/01/18 0912    [facility]  -- ([facility] 11/30/18 1504   Inspection under devices  Full except (identify device(s) not inspected) 12/01/18 0051    Primary Care MD Name  -- ([facility]) 11/30/18 1504   Not Inspected under devices  Other (dressing to lumbar area) 12/01/18 0051    Equipment Used at Home  none 09/06/13 1036   Skin WDL  ex 12/01/18 0912    GASTROINTESTINAL (ADULT,PEDIATRIC,OB)     Skin Temperature  warm 12/01/18 0912    GI WDL  WDL 12/01/18 0912   Skin Moisture  dry 12/01/18 0912    Last Bowel Movement  12/01/18 12/01/18 0912   Skin Elasticity  quick return to original state 12/01/18 0912    Passing flatus  yes 12/01/18 0912   Skin Integrity  incision(s) 12/01/18 0912    COMMUNICATION ASSESSMENT     Additional Documentation  Wound (LDA) 11/30/18 1549    Patient's communication style  spoken language (English or Bilingual) 11/28/18 1109   SAFETY      FINAL RESOURCES     Safety WDL  WDL 12/01/18 0908    Resources List  Home Care;Transitional Care;Skilled Nursing Facility 11/30/18 1504   All Alarms  alarm(s) activated and audible 12/01/18 0908    Skilled Nursing Facility  [facility]  "[phone], Fax: [phone] 12/01/18 1145                      Assessment WDL (Within Defined Limits) Definitions           Safety WDL     Effective: 09/28/15    Row Information: <b>WDL Definition:</b> Bed in low position, wheels locked; call light in reach; upper side rails up x 2; ID band on<br> <font color=\"gray\"><i>Item=AS safety wdl>>List=AS safety wdl>>Version=F14</i></font>      Skin WDL     Effective: 09/28/15    Row Information: <b>WDL Definition:</b> Warm; dry; intact; elastic; without discoloration; pressure points without redness<br> <font color=\"gray\"><i>Item=AS skin wdl>>List=AS skin wdl>>Version=F14</i></font>      Vitals     Vital Signs Flowsheet     VITAL SIGNS     CLINICALLY ALIGNED PAIN ASSESSMENT (CAPA) ([facility], [facility] AND [facility] ADULTS ONLY)      Temp  98.5  F (36.9  C) 12/01/18 0731   Comfort  comfortably manageable 12/01/18 0905    Temp src  Oral 12/01/18 0731   Change in Pain  getting better 12/01/18 0905    Resp  18 12/01/18 0731   Pain Control  fully effective 12/01/18 0905    Pulse  78 12/01/18 0731   Functioning  can do everything I need to 12/01/18 0905    Heart Rate  82 11/30/18 1532   Sleep  normal sleep 11/30/18 1542    Pulse/Heart Rate Source  Monitor 12/01/18 0731   HEIGHT AND WEIGHT      BP  156/61 12/01/18 0731   Height  1.626 m (5' 4\") 11/28/18 1039    BP Location  Right arm 12/01/18 0731   Weight  83.9 kg (185 lb) 11/28/18 1039    Patient Position  Sitting 11/28/18 1039   BSA (Calculated - sq m)  1.95 11/28/18 1039    OXYGEN THERAPY     BMI (Calculated)  31.82 11/28/18 1039    SpO2  96 % 12/01/18 0731   POSITIONING      O2 Device  None (Room air) 12/01/18 0731   Body Position  independently positioning 12/01/18 0905    Oxygen Delivery  -- 11/29/18 0036   Head of Bed (HOB)  HOB at 20-30 degrees 12/01/18 0051    RESPIRATORY MONITORING     Chair  Upright in chair 12/01/18 1104    Respiratory Monitoring (EtCO2)  30 mmHg 11/29/18 0614   Positioning/Transfer Devices  pillows;in " use 12/01/18 1104    Integrated Pulmonary Index (IPI)  8-9 11/29/18 0614   ECG      PAIN/COMFORT     ECG Rhythm  Sinus rhythm 11/28/18 1818    Patient Currently in Pain  denies 12/01/18 0905   Ectopy  None 11/28/18 1818    Preferred Pain Scale  CAPA (Clinically Aligned Pain Assessment) ([facility], [facility] Adults Only) 12/01/18 0905   Lead Monitored  Lead II 11/28/18 1818    0-10 Pain Scale  0 12/01/18 0905   DAILY CARE      Pain Location  Groin 11/29/18 0351   Activity Management  ambulated to bathroom 12/01/18 1104    Pain Orientation  Right 11/29/18 0351   Activity Assistance Provided  assistance, stand-by 12/01/18 1104    Pain Descriptors  Sore 11/29/18 0351   Assistive Device Utilized  walker 12/01/18 1104    Pain Management Interventions  analgesia administered 11/29/18 0413   Ambulation Distance (Feet)  300 11/30/18 1341    Pain Intervention(s)  Cold applied;Repositioned 11/30/18 1542                 Patient Lines/Drains/Airways Status    Active LINES/DRAINS/AIRWAYS     Name: Placement date: Placement time: Site: Days: Last dressing change:    Peripheral IV 12/01/18 Right 12/01/18   0525      less than 1     Wound 11/28/18 Left Cheek Skin tear 11/28/18   2222   Cheek   2     Incision/Surgical Site 11/28/18 Back 11/28/18   1410    2             Patient Lines/Drains/Airways Status    Active PICC/CVC     None            Intake/Output Detail Report     Date Intake     Output     Net    Shift P.O. I.V. IV Piggyback Total Urine Drains Blood Total       Noc 11/29/18 2300 - 11/30/18 0659 -- -- -- -- 425 60 -- 485 -485    Day 11/30/18 0700 - 11/30/18 1459 960 -- -- 960 600 3 -- 603 357    Vijaya 11/30/18 1500 - 11/30/18 2259 300 -- -- 300 250 -- -- 250 50    Noc 11/30/18 2300 - 12/01/18 0659 -- -- -- -- -- -- -- -- 0    Day 12/01/18 0700 - 12/01/18 1459 540 -- -- 540 -- -- -- -- 540      Last Void/BM       Most Recent Value    Urine Occurrence 1 at 12/01/2018 0903    Stool Occurrence 1 [med] at 11/30/2018 4857       Case Management/Discharge Planning     Case Management/Discharge Planning Flowsheet     REFERRAL INFORMATION     Major Change/Loss/Stressor  surgery/procedure 11/28/18 1109    Did the Initial Social Work Assessment result in a Social Work Case?  Yes 11/30/18 1427   EXPECTED DISCHARGE      Admission Type  inpatient 11/30/18 1427   Expected Discharge Date  12/01/18 11/30/18 1504    Arrived From  home or self-care 11/30/18 1427   DISCHARGE PLANNING      Reason For Consult  discharge planning 11/30/18 1427   Equipment Used at Home  none 09/06/13 1036    [facility]  -- ([facility]) 11/30/18 1504   FINAL RESOURCES      Primary Care MD Name  -- (sandra) 11/30/18 1504   Resources List  Home Care;Transitional Care;Skilled Nursing Facility 11/30/18 1504    LIVING ENVIRONMENT     Skilled Nursing Facility  [facility]) [phone], Fax: [phone] 12/01/18 1145    Lives With  child(trace), adult 11/30/18 1504   PAS Number  [id number] 11/30/18 1620    Living Arrangements  house 11/30/18 1504   ABUSE RISK SCREEN      Provides Primary Care For  no one 11/30/18 1504   QUESTION TO PATIENT:  Has a member of your family or a partner(now or in the past) intimidated, hurt, manipulated, or controlled you in any way?  no 11/28/18 1054    ASSESSMENT OF FAMILY/SOCIAL SUPPORT     QUESTION TO PATIENT: Do you feel safe going back to the place where you are living?  yes 11/28/18 1054    Who is your support system?  Children 11/30/18 1504   OBSERVATION: Is there reason to believe there has been maltreatment of a vulnerable adult (ie. Physical/Sexual/Emotional abuse, self neglect, lack of adequate food, shelter, medical care, or financial exploitation)?  no 11/28/18 1054    Description of Support System  Supportive;Involved 11/30/18 1504   OTHER      Support Assessment  Adequate family and caregiver support;Adequate social supports 11/30/18 1504   Are you depressed or being treated for depression?  No  P/t with hx depression c/o of increased depression and anxiety with SI no plan p/t appears calm and cooperative 11/28/18 1109    COPING/STRESS

## 2019-01-21 NOTE — ED BEHAVIORAL HEALTH ASSESSMENT NOTE - NS ED BHA MED ROS MUSCULOSKELETAL
Problem: DISCHARGE PLANNING - CARE MANAGEMENT  Goal: Discharge to post-acute care or home with appropriate resources  INTERVENTIONS:  - Conduct assessment to determine patient/family and health care team treatment goals, and need for post-acute services based on payer coverage, community resources, and patient preferences, and barriers to discharge  - Address psychosocial, clinical, and financial barriers to discharge as identified in assessment in conjunction with the patient/family and health care team  - Arrange appropriate level of post-acute services according to patient's   needs and preference and payer coverage in collaboration with the physician and health care team  - Communicate with and update the patient/family, physician, and health care team regarding progress on the discharge plan  - Arrange appropriate transportation to post-acute venues   Outcome: Adequate for Discharge  Adequate services in place for discharge  No complaints

## 2019-11-23 ENCOUNTER — EMERGENCY (EMERGENCY)
Facility: HOSPITAL | Age: 24
LOS: 1 days | Discharge: HOME | End: 2019-11-23
Admitting: EMERGENCY MEDICINE
Payer: MEDICAID

## 2019-11-23 VITALS
TEMPERATURE: 98 F | OXYGEN SATURATION: 99 % | DIASTOLIC BLOOD PRESSURE: 95 MMHG | SYSTOLIC BLOOD PRESSURE: 142 MMHG | RESPIRATION RATE: 18 BRPM | HEART RATE: 105 BPM

## 2019-11-23 PROCEDURE — 99283 EMERGENCY DEPT VISIT LOW MDM: CPT

## 2019-11-23 RX ADMIN — Medication 1 MILLIGRAM(S): at 14:26

## 2019-11-23 NOTE — ED PROVIDER NOTE - OBJECTIVE STATEMENT
25 y/o F hx Bipolar, BPD, Polysubstance Abuse presents to ER w c/o anxiousness. Admit that she was recently discharged from [facility] and felt as if " a panic attack was coming on". Explicitly denies SI/HI/AH/VH.   Denies falling, punching  or kicking any objects. No evidence of physical,  injures broken skin or deformities.  Denies pain, SOB, fever, chills, chest/abdominal discomfort. Denies recent use of alcohol or illicit drugs. Admits to  medication compliance  LNMP- 10/2019

## 2019-11-23 NOTE — ED ADULT NURSE NOTE - CHIEF COMPLAINT QUOTE
pt was d/xena from [facility] and was in lij lobby on phone and became very upset/  pt brougnht to er/  pt states I was having panic attack  denies HI/ SI/

## 2019-11-23 NOTE — ED PROVIDER NOTE - NSFOLLOWUPCLINICS_GEN_ALL_ED_FT
[facility]  Behavioral Health  [address] [place] [address]  Phone: [phone]  Fax:   Follow Up Time:

## 2019-11-23 NOTE — ED BEHAVIORAL HEALTH NOTE - BEHAVIORAL HEALTH NOTE
SW spoke with patient to provide information for substance abuse services, including [facility].     SW arranged for taxi transportation via [employer] by request of NP. Booking #[id number]

## 2019-11-23 NOTE — ED PROVIDER NOTE - CLINICAL SUMMARY MEDICAL DECISION MAKING FREE TEXT BOX
23 y/o F hx Bipolar, BPD, Polysubstance Abuse   Medical evaluation performed. There is no clinical evidence of intoxication or any acute medical problem requiring immediate intervention.  List of detox facilities provided. Recommend following up with [facility].  D/C home via cab.

## 2019-11-23 NOTE — ED PROVIDER NOTE - PATIENT PORTAL LINK FT
You can access the FollowMyHealth Patient Portal offered by [facility] by registering at the following website: [email or web address]. By joining [facility]’s FollowMyHealth portal, you will also be able to view your health information using other applications (apps) compatible with our system.

## 2019-11-23 NOTE — ED ADULT NURSE NOTE - OBJECTIVE STATEMENT
Pt w/ hx of bipolar 1 disorder, depression, anxiety w/ panic attacks received to  aaox4 ambulatory Pt p/w NAD calm cooperative, breaths even unlabored moderately flat affect, skin warm dry intact.  Pt denies SI HI hallucinations, denies any physical or psychiatric complaints at this time.  Pt reports she boarded a bus from [facility] intending to return home but accidentally came to [facility] and subsequently had a panic attack when she realized she did no have enough money to return home.  Belongings stored per policy, Pt safety maintained.

## 2019-11-23 NOTE — ED ADULT TRIAGE NOTE - CHIEF COMPLAINT QUOTE
pt was d/xena from [facility] and was in [facility] lobby on phone and became very upset/  pt brougnht to er/  pt states I was having panic attack  denies HI/ SI/

## 2025-06-01 NOTE — ED BEHAVIORAL HEALTH ASSESSMENT NOTE - CURRENT MEDICATION
Wellbutrin 200mg AM, Lexapro 30mg Am, Abilify 10mg AM (lost bottle) Cardiac Lexapro 20mg Am, Abilify 10mg AM restarted on medications 10 days ago